# Patient Record
Sex: MALE | Race: WHITE | NOT HISPANIC OR LATINO | Employment: FULL TIME | URBAN - METROPOLITAN AREA
[De-identification: names, ages, dates, MRNs, and addresses within clinical notes are randomized per-mention and may not be internally consistent; named-entity substitution may affect disease eponyms.]

---

## 2019-07-12 ENCOUNTER — HOSPITAL ENCOUNTER (EMERGENCY)
Facility: HOSPITAL | Age: 24
Discharge: HOME | End: 2019-07-12
Attending: EMERGENCY MEDICINE | Admitting: EMERGENCY MEDICINE

## 2019-07-12 VITALS
DIASTOLIC BLOOD PRESSURE: 108 MMHG | BODY MASS INDEX: 41.75 KG/M2 | HEIGHT: 73 IN | HEART RATE: 92 BPM | WEIGHT: 315 LBS | TEMPERATURE: 98.4 F | SYSTOLIC BLOOD PRESSURE: 177 MMHG | OXYGEN SATURATION: 99 % | RESPIRATION RATE: 18 BRPM

## 2019-07-12 DIAGNOSIS — T16.1XXA FOREIGN BODY OF RIGHT EAR, INITIAL ENCOUNTER: Primary | ICD-10-CM

## 2019-07-12 DIAGNOSIS — I10 UNCONTROLLED HYPERTENSION: ICD-10-CM

## 2019-07-12 PROCEDURE — 99282 EMERGENCY DEPT VISIT SF MDM: CPT

## 2019-07-12 PROCEDURE — 99282 EMERGENCY DEPT VISIT SF MDM: CPT | Performed by: EMERGENCY MEDICINE

## 2019-07-12 PROCEDURE — 99283 EMERGENCY DEPT VISIT LOW MDM: CPT

## 2019-07-12 RX ORDER — NEOMYCIN SULFATE, POLYMYXIN B SULFATE AND HYDROCORTISONE 10; 3.5; 1 MG/ML; MG/ML; [USP'U]/ML
SUSPENSION/ DROPS AURICULAR (OTIC)
Qty: 10 ML | Refills: 0 | Status: SHIPPED | OUTPATIENT
Start: 2019-07-12 | End: 2020-08-11

## 2019-07-12 RX ORDER — HYDROCHLOROTHIAZIDE 25 MG/1
TABLET ORAL
Qty: 30 TABLET | Refills: 0 | Status: SHIPPED | OUTPATIENT
Start: 2019-07-12 | End: 2020-08-11

## 2019-07-12 RX ORDER — LIDOCAINE HYDROCHLORIDE 10 MG/ML
5 INJECTION, SOLUTION EPIDURAL; INFILTRATION; INTRACAUDAL; PERINEURAL ONCE
Status: COMPLETED | OUTPATIENT
Start: 2019-07-12 | End: 2019-07-12

## 2019-07-12 RX ORDER — NEOMYCIN SULFATE, POLYMYXIN B SULFATE AND HYDROCORTISONE 10; 3.5; 1 MG/ML; MG/ML; [USP'U]/ML
4 SUSPENSION/ DROPS AURICULAR (OTIC) ONCE
Status: COMPLETED | OUTPATIENT
Start: 2019-07-12 | End: 2019-07-12

## 2019-07-12 RX ADMIN — LIDOCAINE HYDROCHLORIDE 5 ML: 10 INJECTION, SOLUTION EPIDURAL; INFILTRATION; INTRACAUDAL; PERINEURAL at 02:55

## 2019-07-12 RX ADMIN — NEOMYCIN SULFATE, POLYMYXIN B SULFATE AND HYDROCORTISONE 4 DROP: 3.5; 10000; 1 SUSPENSION AURICULAR (OTIC) at 03:27

## 2019-07-12 NOTE — ED NOTES
Right ear irrigated with warm water.  Bug finally washed out.     Lamar Wright RN  07/12/19 4821

## 2019-07-12 NOTE — ED PROVIDER NOTES
" EMERGENCY DEPARTMENT ENCOUNTER      Room Number: 2/02      HPI:    Chief complaint: Bug in ear    Location: right    Quality/Severity:  moderate    Timing/Duration: just PTA    Modifying Factors: not improved c sticking Q-tip into ear after bug    Associated Symptoms: diminished hearing \"clogged\" sounding right ear. No bleeding or discharge    Narrative: Pt is a 23 y.o. male who presents complaining of bug flew into right ear just PTA. He tried irrigation and sticking a Q-tip in his ear PTA without relief. Diminished hearing in right ear now, \"like it is clogged.\"  Pt admits to hx of hypertension previously treated c medication which he stopped at 19 yo as he has not seen a PCP since.      PMD: Provider, No Known    REVIEW OF SYSTEMS  Review of Systems  Diminished hearing R ear. No bleeding.    PAST MEDICAL HISTORY  Active Ambulatory Problems     Diagnosis Date Noted   • No Active Ambulatory Problems     Resolved Ambulatory Problems     Diagnosis Date Noted   • No Resolved Ambulatory Problems     Past Medical History:   Diagnosis Date   • Hypertension        PAST SURGICAL HISTORY  Past Surgical History:   Procedure Laterality Date   • WRIST SURGERY         FAMILY HISTORY  History reviewed. No pertinent family history.    SOCIAL HISTORY  Social History     Socioeconomic History   • Marital status: Single     Spouse name: Not on file   • Number of children: Not on file   • Years of education: Not on file   • Highest education level: Not on file   Tobacco Use   • Smoking status: Current Every Day Smoker     Packs/day: 1.00   Substance and Sexual Activity   • Alcohol use: No     Frequency: Never   • Drug use: No       ALLERGIES  Penicillins    PHYSICAL EXAM  ED Triage Vitals [07/12/19 0253]   Temp Heart Rate Resp BP SpO2   98.4 °F (36.9 °C) 92 18 (!) 177/108 99 %      Temp src Heart Rate Source Patient Position BP Location FiO2 (%)   Oral -- -- -- --       Physical Exam   Constitutional: He is oriented to person, " place, and time and well-developed, well-nourished, and in no distress. No distress.   HENT:   MMM, poor dentition.  L TM nml  R ext auditory canal is excoritated without bleeding. FB c/w bug is deep. TM completely obscured. Diminished hearing confirmed.   Eyes: Conjunctivae are normal.   Neck:   Painless ROM noted   Cardiovascular: Normal rate and regular rhythm.   Pulmonary/Chest: Effort normal. No respiratory distress.   Neurological: He is alert and oriented to person, place, and time.   Skin: Skin is warm and dry.   Psychiatric: Mood and affect normal.       LAB RESULTS  No results found for this or any previous visit.      I ordered the above labs and reviewed the results    RADIOLOGY  No results found.    I ordered the above radiologic testing and reviewed the results    PROCEDURES  Procedures      PROGRESS AND CONSULTS  ED Course as of Jul 12 0326 Fri Jul 12, 2019   0324 Nursing completed successful irrigation of the right external auditory canal with complete removal of the foreign body.  Excoriation of the canal remains though the TM appears intact.  Hearing improved.  Plan Cortisporin and outpatient ENT follow-up with any concerns or continued complaints early next week.  Also discussed hypertension and need for outpatient follow-up.  Patient agreeable with plan to start HCTZ immediately.  [RS]      ED Course User Index  [RS] Flynn Walker MD           MEDICAL DECISION MAKING  Results were reviewed/discussed with the patient and they were also made aware of online access. Pt also made aware that some labs, such as cultures, will not be resulted during ER visit and follow up with PMD is necessary.     MDM       DIAGNOSIS  Final diagnoses:   Foreign body of right ear, bug-successfully removed   Uncontrolled hypertension       Latest Documented Vital Signs:  As of 3:26 AM  BP- (!) 177/108 HR- 92 Temp- 98.4 °F (36.9 °C) (Oral) O2 sat- 99%    DISPOSITION  Discharge-stable       Medication List      New  Prescriptions    hydrochlorothiazide 25 MG tablet  Commonly known as:  HYDRODIURIL  Take 1 tablet by mouth daily     neomycin-polymyxin-hydrocortisone 3.5-99249-9 otic suspension  Commonly known as:  CORTISPORIN  3-4 drops to affected ear 4 times daily for 1 week          Follow-up Information     [facility]. Call today.    Why:  Arrange outpatient follow-up for regular health maintenance  Contact information:  [address] [place] [address] [phone]           Izzy Acuna MD. Schedule an appointment as soon as possible for a visit today.    Specialty:  Otolaryngology  Why:  For wound re-check  Contact information:  [address] [facility] [address]  [place] [address]  [phone]                        Flynn Walker MD  07/12/19 0329

## 2020-06-22 ENCOUNTER — APPOINTMENT (OUTPATIENT)
Dept: GENERAL RADIOLOGY | Facility: HOSPITAL | Age: 25
End: 2020-06-22

## 2020-06-22 ENCOUNTER — HOSPITAL ENCOUNTER (EMERGENCY)
Facility: HOSPITAL | Age: 25
Discharge: HOME | End: 2020-06-22
Admitting: EMERGENCY MEDICINE

## 2020-06-22 VITALS
RESPIRATION RATE: 18 BRPM | SYSTOLIC BLOOD PRESSURE: 181 MMHG | HEART RATE: 78 BPM | DIASTOLIC BLOOD PRESSURE: 90 MMHG | TEMPERATURE: 98.9 F | OXYGEN SATURATION: 98 % | BODY MASS INDEX: 40.43 KG/M2 | HEIGHT: 74 IN | WEIGHT: 315 LBS

## 2020-06-22 DIAGNOSIS — I10 HYPERTENSION NOT AT GOAL: ICD-10-CM

## 2020-06-22 DIAGNOSIS — R07.89 ACUTE CHEST WALL PAIN: Primary | ICD-10-CM

## 2020-06-22 PROCEDURE — 99284 EMERGENCY DEPT VISIT MOD MDM: CPT | Performed by: EMERGENCY MEDICINE

## 2020-06-22 PROCEDURE — 99282 EMERGENCY DEPT VISIT SF MDM: CPT

## 2020-06-22 PROCEDURE — 71046 X-RAY EXAM CHEST 2 VIEWS: CPT

## 2020-06-22 PROCEDURE — 93010 ELECTROCARDIOGRAM REPORT: CPT | Performed by: INTERNAL MEDICINE

## 2020-06-22 PROCEDURE — 93005 ELECTROCARDIOGRAM TRACING: CPT

## 2020-06-22 NOTE — ED PROVIDER NOTES
EMERGENCY DEPARTMENT ENCOUNTER    Room Number:  Room/bed info not found  Date seen:  6/22/2020  Time seen: 1:56 PM  PCP: Provider, No Known  Historian: Patient      HPI:  Chief Complaint: Chest pain  A complete HPI/ROS/PMH/PSH/SH/FH are unobtainable due to: Nothing  Context: Jimi Contreras is a 24 y.o. male who presents to the ED c/o chest pain for 1 week.  He reports he has been constant.  He had a cough that has improved.  He denies fever, chills, shortness of air.  He also reports some pain in his back.  The pain is exacerbated by moving.  He reports a history of high blood pressure diagnosed 4 years ago and does not currently take blood pressure medication.  He stopped smoking 1 month ago.  He denies history of DVT or PE.            PAST MEDICAL HISTORY  Active Ambulatory Problems     Diagnosis Date Noted   • No Active Ambulatory Problems     Resolved Ambulatory Problems     Diagnosis Date Noted   • No Resolved Ambulatory Problems     Past Medical History:   Diagnosis Date   • Hypertension          PAST SURGICAL HISTORY  Past Surgical History:   Procedure Laterality Date   • WRIST SURGERY           FAMILY HISTORY  History reviewed. No pertinent family history.      SOCIAL HISTORY  Social History     Socioeconomic History   • Marital status: Single     Spouse name: Not on file   • Number of children: Not on file   • Years of education: Not on file   • Highest education level: Not on file   Tobacco Use   • Smoking status: Current Every Day Smoker     Packs/day: 1.00   Substance and Sexual Activity   • Alcohol use: No     Frequency: Never   • Drug use: No         ALLERGIES  Penicillins        REVIEW OF SYSTEMS  Review of Systems   Constitutional: Negative for diaphoresis and fever.   HENT: Negative for congestion.    Eyes: Negative for visual disturbance.   Respiratory: Negative for shortness of breath.    Cardiovascular: Positive for chest pain. Negative for palpitations.   Gastrointestinal: Negative for blood  in stool and vomiting.   Endocrine: Negative for polyuria.   Genitourinary: Negative for flank pain.   Musculoskeletal: Negative for joint swelling.   Skin: Negative for wound.   Neurological: Negative for seizures.   Hematological: Negative for adenopathy.   Psychiatric/Behavioral: Negative for sleep disturbance.            PHYSICAL EXAM  ED Triage Vitals   Temp Heart Rate Resp BP SpO2   06/22/20 1237 06/22/20 1237 06/22/20 1237 06/22/20 1240 06/22/20 1237   98.9 °F (37.2 °C) 78 18 (!) 181/90 98 %      Temp src Heart Rate Source Patient Position BP Location FiO2 (%)   06/22/20 1237 06/22/20 1237 -- -- --   Oral Monitor            GENERAL: Morbidly obese, no acute distress  HENT: nares patent  EYES: no scleral icterus  CV: regular rhythm, normal rate, no murmur  RESPIRATORY: normal effort, lungs clear to auscultation bilaterally.  Left anterior chest wall tenderness.  ABDOMEN: soft, nontender throughout  MUSCULOSKELETAL: no deformity.  Left anterior chest wall is mildly tender.  He also has some mild tenderness of the left upper back just medial to the scapula.  NEURO: alert, moves all extremities, follows commands  SKIN: warm, dry    Vital signs and nursing notes reviewed.            RADIOLOGY  Xr Chest 2 View    Result Date: 6/22/2020  CHEST X-RAY, 06/22/2020     HISTORY: 24-year-old male in the ED complaining of upper midline and left side chest pain.  TECHNIQUE: PA and lateral upright chest series.  FINDINGS: Heart size and pulmonary vascularity are normal. The lungs are expanded and clear. No visible pulmonary infiltrate or pleural effusion.      Negative chest.  This report was finalized on 6/22/2020 1:36 PM by Dr. Bakari Randhawa MD.        Ordered the above noted radiological studies. Reviewed by me in PACS.            PROCEDURES  Procedures      EKG interpreted by me:  Time: 1236  Rate and rhythm: Sinus rhythm, 74  IA: Normal  QRS: Normal axis  ST and T waves: No acute ischemic changes        MEDICATIONS  GIVEN IN ER  Medications - No data to display                MEDICAL DECISION MAKING, PROGRESS, and CONSULTS         MDM       HEART score 0 (caveat: no troponin ordered due to reproducible tenderness). EKG normal.  CXR without pneumothorax or mediastinal widening to suggest dissection.  Low risk for PE by Well's score, clinical gestault, PERC.  Reproducible chest wall tenderness suggest musculoskeletal pathology.  This is likely costochondritis related to his recent coughing.  He has no symptoms of COVID at this time.  His cough has since resolved.  Appropriate for discharge home with Tylenol PRN.  Follow-up with PCP for further blood pressure management.      DIAGNOSIS  Final diagnoses:   Acute chest wall pain   Hypertension not at goal         DISPOSITION  DISCHARGE    Patient discharged in stable condition.    Reviewed implications of results, diagnosis, meds, responsibility to follow up, warning signs and symptoms of possible worsening, potential complications and reasons to return to ER.    Patient/Family voiced understanding of above instructions.    Discussed plan for discharge, as there is no emergent indication for admission. Patient referred to primary care provider for BP management due to today's BP. Pt/family is agreeable and understands need for follow up and repeat testing.  Pt is aware that discharge does not mean that nothing is wrong but it indicates no emergency is present that requires admission and they must continue care with follow-up as given below or physician of their choice.     FOLLOW-UP  Tariq King Jr., DO  [address] [facility] [address] [phone]    Schedule an appointment as soon as possible for a visit            Medication List      No changes were made to your prescriptions during this visit.                   Latest Documented Vital Signs:  As of 13:56  BP- (!) 181/90 HR- 78 Temp- 98.9 °F (37.2 °C) (Oral) O2 sat- 98%        --    Please note that portions of  this were completed with a voice recognition program.          Chris Miles MD  06/22/20 0207

## 2020-06-22 NOTE — DISCHARGE INSTRUCTIONS
Take Tylenol as needed for pain.  Your blood pressure is elevated today.  Please establish care with a primary care physician for management of your high blood pressure.  Return to the ER for worsening symptoms.

## 2020-11-22 ENCOUNTER — APPOINTMENT (OUTPATIENT)
Dept: GENERAL RADIOLOGY | Facility: HOSPITAL | Age: 25
End: 2020-11-22

## 2020-11-22 ENCOUNTER — HOSPITAL ENCOUNTER (EMERGENCY)
Facility: HOSPITAL | Age: 25
Discharge: HOME | End: 2020-11-22
Attending: EMERGENCY MEDICINE | Admitting: EMERGENCY MEDICINE

## 2020-11-22 VITALS
TEMPERATURE: 98.3 F | RESPIRATION RATE: 14 BRPM | WEIGHT: 315 LBS | SYSTOLIC BLOOD PRESSURE: 148 MMHG | HEIGHT: 74 IN | DIASTOLIC BLOOD PRESSURE: 87 MMHG | HEART RATE: 70 BPM | BODY MASS INDEX: 40.43 KG/M2 | OXYGEN SATURATION: 100 %

## 2020-11-22 DIAGNOSIS — R00.2 PALPITATIONS: Primary | ICD-10-CM

## 2020-11-22 DIAGNOSIS — I10 HYPERTENSION, UNSPECIFIED TYPE: ICD-10-CM

## 2020-11-22 LAB
ALBUMIN SERPL-MCNC: 4.3 G/DL (ref 3.5–5.2)
ALBUMIN/GLOB SERPL: 1.4 G/DL
ALP SERPL-CCNC: 90 U/L (ref 39–117)
ALT SERPL W P-5'-P-CCNC: 21 U/L (ref 1–41)
ANION GAP SERPL CALCULATED.3IONS-SCNC: 8.4 MMOL/L (ref 5–15)
AST SERPL-CCNC: 15 U/L (ref 1–40)
BASOPHILS # BLD AUTO: 0.06 10*3/MM3 (ref 0–0.2)
BASOPHILS NFR BLD AUTO: 0.6 % (ref 0–1.5)
BILIRUB SERPL-MCNC: 0.4 MG/DL (ref 0–1.2)
BUN SERPL-MCNC: 8 MG/DL (ref 6–20)
BUN/CREAT SERPL: 8.2 (ref 7–25)
CALCIUM SPEC-SCNC: 9.9 MG/DL (ref 8.6–10.5)
CHLORIDE SERPL-SCNC: 102 MMOL/L (ref 98–107)
CO2 SERPL-SCNC: 26.6 MMOL/L (ref 22–29)
CREAT SERPL-MCNC: 0.98 MG/DL (ref 0.76–1.27)
DEPRECATED RDW RBC AUTO: 41.6 FL (ref 37–54)
EOSINOPHIL # BLD AUTO: 0.28 10*3/MM3 (ref 0–0.4)
EOSINOPHIL NFR BLD AUTO: 2.8 % (ref 0.3–6.2)
ERYTHROCYTE [DISTWIDTH] IN BLOOD BY AUTOMATED COUNT: 12.5 % (ref 12.3–15.4)
GFR SERPL CREATININE-BSD FRML MDRD: 96 ML/MIN/1.73
GLOBULIN UR ELPH-MCNC: 3 GM/DL
GLUCOSE SERPL-MCNC: 97 MG/DL (ref 65–99)
HCT VFR BLD AUTO: 50 % (ref 37.5–51)
HGB BLD-MCNC: 16.2 G/DL (ref 13–17.7)
IMM GRANULOCYTES # BLD AUTO: 0.03 10*3/MM3 (ref 0–0.05)
IMM GRANULOCYTES NFR BLD AUTO: 0.3 % (ref 0–0.5)
LYMPHOCYTES # BLD AUTO: 1.85 10*3/MM3 (ref 0.7–3.1)
LYMPHOCYTES NFR BLD AUTO: 18.3 % (ref 19.6–45.3)
MCH RBC QN AUTO: 29.3 PG (ref 26.6–33)
MCHC RBC AUTO-ENTMCNC: 32.4 G/DL (ref 31.5–35.7)
MCV RBC AUTO: 90.4 FL (ref 79–97)
MONOCYTES # BLD AUTO: 0.65 10*3/MM3 (ref 0.1–0.9)
MONOCYTES NFR BLD AUTO: 6.4 % (ref 5–12)
NEUTROPHILS NFR BLD AUTO: 7.26 10*3/MM3 (ref 1.7–7)
NEUTROPHILS NFR BLD AUTO: 71.6 % (ref 42.7–76)
NRBC BLD AUTO-RTO: 0 /100 WBC (ref 0–0.2)
PLATELET # BLD AUTO: 242 10*3/MM3 (ref 140–450)
PMV BLD AUTO: 11.2 FL (ref 6–12)
POTASSIUM SERPL-SCNC: 4.2 MMOL/L (ref 3.5–5.2)
PROT SERPL-MCNC: 7.3 G/DL (ref 6–8.5)
RBC # BLD AUTO: 5.53 10*6/MM3 (ref 4.14–5.8)
SODIUM SERPL-SCNC: 137 MMOL/L (ref 136–145)
WBC # BLD AUTO: 10.13 10*3/MM3 (ref 3.4–10.8)

## 2020-11-22 PROCEDURE — 71046 X-RAY EXAM CHEST 2 VIEWS: CPT

## 2020-11-22 PROCEDURE — 99284 EMERGENCY DEPT VISIT MOD MDM: CPT

## 2020-11-22 PROCEDURE — 85025 COMPLETE CBC W/AUTO DIFF WBC: CPT | Performed by: EMERGENCY MEDICINE

## 2020-11-22 PROCEDURE — 80053 COMPREHEN METABOLIC PANEL: CPT | Performed by: EMERGENCY MEDICINE

## 2020-11-22 PROCEDURE — 93010 ELECTROCARDIOGRAM REPORT: CPT | Performed by: INTERNAL MEDICINE

## 2020-11-22 PROCEDURE — 93005 ELECTROCARDIOGRAM TRACING: CPT | Performed by: EMERGENCY MEDICINE

## 2020-11-22 PROCEDURE — 99283 EMERGENCY DEPT VISIT LOW MDM: CPT | Performed by: EMERGENCY MEDICINE

## 2020-11-22 RX ORDER — SODIUM CHLORIDE 0.9 % (FLUSH) 0.9 %
10 SYRINGE (ML) INJECTION AS NEEDED
Status: DISCONTINUED | OUTPATIENT
Start: 2020-11-22 | End: 2020-11-22 | Stop reason: HOSPADM

## 2020-11-22 RX ADMIN — SODIUM CHLORIDE 1000 ML: 9 INJECTION, SOLUTION INTRAVENOUS at 19:37

## 2020-11-23 LAB — QT INTERVAL: 355 MS

## 2020-11-23 NOTE — ED PROVIDER NOTES
EMERGENCY DEPARTMENT ENCOUNTER      Room Number: TRAU/TR      HPI:      Narrative: Pt is a 24 y.o. male who presents complaining of what he describes as rapid heart beating and a tight sensation.  Initially described to triage his pain he tells me is not really pain it was more like a strange sensation as he felt like his heart rate was beating very quickly and firmly.  There was no associated shortness of breath no associated nausea or vomiting no associated diaphoresis and no dizziness.  He says just prior to this he was working [employer] and his boss said he looked flushed and should sit down so he sat down for a minute and developed a rapid and strong heartbeat which she felt was a little bit tight as well.  He said he felt anxious during this and reports having history of anxiety that he does not take anything for..  He says that his symptoms resolved relatively quickly and he is asymptomatic currently.  Other than this he has been feeling very well recently.      PMD: Provider, No Known    REVIEW OF SYSTEMS  Review of Systems   Constitutional: Negative for activity change, chills and fever.   HENT: Negative for facial swelling and trouble swallowing.    Eyes: Negative for photophobia and visual disturbance.   Respiratory: Negative for chest tightness and shortness of breath.    Cardiovascular: Positive for palpitations. Negative for chest pain and leg swelling.   Gastrointestinal: Negative for abdominal distention, abdominal pain, nausea and vomiting.   Genitourinary: Negative for difficulty urinating and dysuria.   Neurological: Negative for dizziness and weakness.         PAST MEDICAL HISTORY  Active Ambulatory Problems     Diagnosis Date Noted   • No Active Ambulatory Problems     Resolved Ambulatory Problems     Diagnosis Date Noted   • No Resolved Ambulatory Problems     Past Medical History:   Diagnosis Date   • Asthma    • Hypertension        PAST SURGICAL HISTORY  Past Surgical History:   Procedure  Laterality Date   • DENTAL PROCEDURE     • WRIST FRACTURE SURGERY      RT wrist   • WRIST SURGERY         FAMILY HISTORY  History reviewed. No pertinent family history.    SOCIAL HISTORY  Social History     Socioeconomic History   • Marital status: Single     Spouse name: Not on file   • Number of children: Not on file   • Years of education: Not on file   • Highest education level: Not on file   Tobacco Use   • Smoking status: Current Every Day Smoker     Packs/day: 1.00   • Smokeless tobacco: Never Used   • Tobacco comment: Quit smoking 5/2020   Substance and Sexual Activity   • Alcohol use: No     Frequency: Never   • Drug use: No   • Sexual activity: Defer       ALLERGIES  Penicillins      Current Facility-Administered Medications:   •  [COMPLETED] Insert peripheral IV, , , Once **AND** sodium chloride 0.9 % flush 10 mL, 10 mL, Intravenous, PRN, Jose Tellez MD  No current outpatient medications on file.    PHYSICAL EXAM  ED Triage Vitals   Temp Heart Rate Resp BP SpO2   11/22/20 1915 11/22/20 1915 11/22/20 1915 11/22/20 1917 11/22/20 1917   97.5 °F (36.4 °C) 80 16 (!) 161/117 99 %      Temp src Heart Rate Source Patient Position BP Location FiO2 (%)   11/22/20 1915 -- -- -- --   Skin           Physical Exam  INITIAL VITAL SIGNS: Reviewed by me.  Pulse ox normal  GENERAL: Alert and interactive. No acute distress.  HEAD: Head is normocephalic.  EYES: EOMI. PERRL. No scleral icterus. No conjunctival injection.  ENT: Moist mucous membranes.   NECK: Supple. Full range of motion.  RESPIRATORY: No tachypnea. Clear breath sounds bilaterally. No wheezing. No rales. No rhonchi.  CV: Regular rate and rhythm. No murmurs. No rubs or gallops.  ABDOMEN: Soft, non-distended, non-tender. No guarding. No rebound. No masses.   BACK:  No obvious deformity.  EXTREMITIES: No deformity. No clubbing or cyanosis. No edema.   SKIN: Warm and dry. No diaphoresis. No obvious rashes.   NEUROLOGIC: Alert and oriented. Face is symmetric.  Speech is normal.       LAB RESULTS  Results for orders placed or performed during the hospital encounter of 11/22/20   Comprehensive Metabolic Panel    Specimen: Blood   Result Value Ref Range    Glucose 97 65 - 99 mg/dL    BUN 8 6 - 20 mg/dL    Creatinine 0.98 0.76 - 1.27 mg/dL    Sodium 137 136 - 145 mmol/L    Potassium 4.2 3.5 - 5.2 mmol/L    Chloride 102 98 - 107 mmol/L    CO2 26.6 22.0 - 29.0 mmol/L    Calcium 9.9 8.6 - 10.5 mg/dL    Total Protein 7.3 6.0 - 8.5 g/dL    Albumin 4.30 3.50 - 5.20 g/dL    ALT (SGPT) 21 1 - 41 U/L    AST (SGOT) 15 1 - 40 U/L    Alkaline Phosphatase 90 39 - 117 U/L    Total Bilirubin 0.4 0.0 - 1.2 mg/dL    eGFR Non African Amer 96 >60 mL/min/1.73    Globulin 3.0 gm/dL    A/G Ratio 1.4 g/dL    BUN/Creatinine Ratio 8.2 7.0 - 25.0    Anion Gap 8.4 5.0 - 15.0 mmol/L   CBC Auto Differential    Specimen: Blood   Result Value Ref Range    WBC 10.13 3.40 - 10.80 10*3/mm3    RBC 5.53 4.14 - 5.80 10*6/mm3    Hemoglobin 16.2 13.0 - 17.7 g/dL    Hematocrit 50.0 37.5 - 51.0 %    MCV 90.4 79.0 - 97.0 fL    MCH 29.3 26.6 - 33.0 pg    MCHC 32.4 31.5 - 35.7 g/dL    RDW 12.5 12.3 - 15.4 %    RDW-SD 41.6 37.0 - 54.0 fl    MPV 11.2 6.0 - 12.0 fL    Platelets 242 140 - 450 10*3/mm3    Neutrophil % 71.6 42.7 - 76.0 %    Lymphocyte % 18.3 (L) 19.6 - 45.3 %    Monocyte % 6.4 5.0 - 12.0 %    Eosinophil % 2.8 0.3 - 6.2 %    Basophil % 0.6 0.0 - 1.5 %    Immature Grans % 0.3 0.0 - 0.5 %    Neutrophils, Absolute 7.26 (H) 1.70 - 7.00 10*3/mm3    Lymphocytes, Absolute 1.85 0.70 - 3.10 10*3/mm3    Monocytes, Absolute 0.65 0.10 - 0.90 10*3/mm3    Eosinophils, Absolute 0.28 0.00 - 0.40 10*3/mm3    Basophils, Absolute 0.06 0.00 - 0.20 10*3/mm3    Immature Grans, Absolute 0.03 0.00 - 0.05 10*3/mm3    nRBC 0.0 0.0 - 0.2 /100 WBC   ECG 12 Lead   Result Value Ref Range    QT Interval 355 ms         I ordered the above labs and reviewed the results    RADIOLOGY  Xr Chest 2 View    Result Date: 11/22/2020  CR Chest 2 Vws  INDICATION:  Chest pain on the left radiating to the left arm beginning 30 minutes prior to arrival COMPARISON:  6/22/2020 FINDINGS: PA and lateral views of the chest.  Heart and mediastinal contours are normal. The lungs are clear. No pneumothorax or pleural effusion.      No acute cardiopulmonary findings. Signer Name: Jose Schaeffer MD  Signed: 11/22/2020 8:00 PM  Workstation Name: [email or web address]  [facility]      I ordered the above radiologic testing and reviewed the results    PROCEDURES  Procedures  EKG           EKG time/Interp time: 1913/1920  Rhythm/Rate: Sinus rhythm rate of 78  P waves and VT: Normal P waves with normal VT interval  QRS, axis: Normal QRS duration and normal axis  ST and T waves: No ST elevations or depressions    Independently interpreted by me contemporaneously with treatment      PROGRESS AND CONSULTS           MEDICAL DECISION MAKING      MDM   Well-appearing 24-year-old male presents to the ER complaining of chest pain at triage but when I questioned him to describe it he says it is not pain but rather palpitations that lasted for a few minutes while at work.  He is asymptomatic right now and appears very well and has normal vital signs.  His ECG is normal I will check basic labs and get a chest x-ray.  This does not sound suspicious for ACS and he meets the PE rule out criteria.  Just make sure that his electrolytes are normal give him some fluids and make sure there is no evidence of pneumothorax or pneumonia.    Electrolyte panel is normal, no elevation in white blood cell count, his chest x-ray is clear his ECG is without dysrhythmia or acute ischemia.  I feel patient can be safely discharged home.    DIAGNOSIS  Final diagnoses:   Palpitations   Hypertension, unspecified type       Latest Documented Vital Signs:  As of 20:20 EST  BP- (!) 161/117 HR- 80 Temp- 97.5 °F (36.4 °C) (Skin) O2 sat- 99%    DISPOSITION  Home      Discussed pertinent labs and imaging  findings with the patient/family.  Patient/Family voiced understanding of need to follow-up for recheck, further testing as needed.  Return to the emergency Department warnings were given.         Medication List      No changes were made to your prescriptions during this visit.             Follow-up Information     Call  Provider, No Known.    Why: To set up primary care and a follow-up appointment.  Contact information:  [facility] [id number] [phone]                     Dictated utilizing Dragon dictation     Jose Tellez MD  11/22/20 2021

## 2020-11-23 NOTE — DISCHARGE INSTRUCTIONS
Please call the number listed to establish primary care and set up a follow-up appointment.  Return to the emergency room if you develop difficulty breathing, chest pain,

## 2020-11-23 NOTE — ED TRIAGE NOTES
Explained to pt that he is hurting himself by not taking BP meds as prescribed and that we may cannot undo the damage he is doing to himself.

## 2021-06-03 ENCOUNTER — HOSPITAL ENCOUNTER (EMERGENCY)
Facility: HOSPITAL | Age: 26
Discharge: HOME | End: 2021-06-03
Attending: EMERGENCY MEDICINE | Admitting: EMERGENCY MEDICINE

## 2021-06-03 VITALS
HEIGHT: 73 IN | HEART RATE: 111 BPM | BODY MASS INDEX: 41.75 KG/M2 | TEMPERATURE: 99.2 F | RESPIRATION RATE: 18 BRPM | DIASTOLIC BLOOD PRESSURE: 96 MMHG | WEIGHT: 315 LBS | OXYGEN SATURATION: 97 % | SYSTOLIC BLOOD PRESSURE: 141 MMHG

## 2021-06-03 DIAGNOSIS — K04.7 DENTAL ABSCESS: Primary | ICD-10-CM

## 2021-06-03 PROCEDURE — 99282 EMERGENCY DEPT VISIT SF MDM: CPT | Performed by: EMERGENCY MEDICINE

## 2021-06-03 PROCEDURE — 99283 EMERGENCY DEPT VISIT LOW MDM: CPT

## 2021-06-03 RX ORDER — CLINDAMYCIN HYDROCHLORIDE 300 MG/1
300 CAPSULE ORAL 3 TIMES DAILY
Qty: 21 CAPSULE | Refills: 0 | Status: SHIPPED | OUTPATIENT
Start: 2021-06-03 | End: 2021-06-10

## 2021-06-03 RX ORDER — CLINDAMYCIN HYDROCHLORIDE 150 MG/1
300 CAPSULE ORAL ONCE
Status: COMPLETED | OUTPATIENT
Start: 2021-06-03 | End: 2021-06-03

## 2021-06-03 RX ADMIN — CLINDAMYCIN HYDROCHLORIDE 300 MG: 150 CAPSULE ORAL at 09:02

## 2021-06-03 NOTE — DISCHARGE INSTRUCTIONS
Take antibiotics as directed.  Please return to the emergency room for any worsening pain, swelling, fevers, redness or any other concerns.

## 2021-06-03 NOTE — ED PROVIDER NOTES
Subjective   History of Present Illness  History of Present Illness    Chief complaint: Dental pain    Location: Upper incisors    Quality/Severity: Moderate pain and swelling    Timing/Duration: Present for the past 5 days    Modifying Factors: None    Narrative: This patient presents for evaluation of dental pain.  He tells me that he has had worsening pain in his upper teeth for the past 5 days and he is concerned about an abscess formation.  He does not have any dental providers.  He denies any recent trauma to the teeth.  He denies any methamphetamine or substance abuse.  He tells me he had an abnormal pattern of growth with unusual eruption of his upper incisors at the age of 18.  He tells me that he is supposed to get several teeth pulled but has not been able to do so.  He denies any fevers.    Associated Symptoms: As above    Review of Systems   Constitutional: Negative for activity change and fever.   HENT: Positive for dental problem and mouth sores. Negative for drooling, trouble swallowing and voice change.    Respiratory: Negative for cough and shortness of breath.    Cardiovascular: Negative for chest pain.   Gastrointestinal: Negative for abdominal pain.   Skin: Negative for color change and rash.   Neurological: Negative for syncope.   All other systems reviewed and are negative.      Past Medical History:   Diagnosis Date   • Asthma    • H/O dental abscess    • Hypertension        Allergies   Allergen Reactions   • Penicillins Swelling       Past Surgical History:   Procedure Laterality Date   • DENTAL PROCEDURE     • WRIST FRACTURE SURGERY      RT wrist   • WRIST SURGERY         History reviewed. No pertinent family history.    Social History     Socioeconomic History   • Marital status: Single     Spouse name: Not on file   • Number of children: Not on file   • Years of education: Not on file   • Highest education level: Not on file   Tobacco Use   • Smoking status: Current Every Day Smoker      Packs/day: 1.00   • Smokeless tobacco: Never Used   • Tobacco comment: Quit smoking 5/2020   Substance and Sexual Activity   • Alcohol use: No   • Drug use: No   • Sexual activity: Defer       ED Triage Vitals [06/03/21 0852]   Temp Heart Rate Resp BP SpO2   99.2 °F (37.3 °C) 111 18 141/96 97 %      Temp src Heart Rate Source Patient Position BP Location FiO2 (%)   Oral Monitor Sitting Right arm --         Objective   Physical Exam  Vitals and nursing note reviewed.   Constitutional:       Appearance: He is well-developed. He is not toxic-appearing.   HENT:      Head: Normocephalic and atraumatic.      Comments: There is no significant facial soft tissue swelling or erythema evident.     Nose: Nose normal.      Mouth/Throat:      Mouth: Mucous membranes are moist.      Comments: Both of the upper medial incisor show extensive decay.  The right medial upper incisor seems to be the tooth of greatest concern to the patient.  There is significant decay in this tooth with surrounding gingival tenderness and some ulcer formation in the mucosal aspect of the upper lip abutting this tooth.  Eyes:      General:         Right eye: No discharge.         Left eye: No discharge.      Pupils: Pupils are equal, round, and reactive to light.   Cardiovascular:      Rate and Rhythm: Normal rate and regular rhythm.   Pulmonary:      Effort: Pulmonary effort is normal. No respiratory distress.   Musculoskeletal:         General: No deformity. Normal range of motion.      Cervical back: Normal range of motion and neck supple.   Skin:     General: Skin is warm and dry.      Findings: No erythema or rash.   Neurological:      General: No focal deficit present.      Mental Status: He is alert and oriented to person, place, and time.   Psychiatric:         Behavior: Behavior normal.         Thought Content: Thought content normal.         Judgment: Judgment normal.         Procedures           ED Course  ED Course as of Jun 03 0904   u  Jun 03, 2021 0901 Patient presented for evaluation of a dental abscess problem.  I started him on clindamycin because he has a reported history of penicillin allergy.  I urged him to follow-up with a local dental clinic you can care for his dental concerns appropriately.  Gave him resource information to do so.    [CHRISTIAN]      ED Course User Index  [CHRISTIAN] Terrance Luo MD                                           [facility]    Final diagnoses:   Dental abscess       ED Disposition  ED Disposition     ED Disposition Condition Comment    Discharge Stable           [facility] [place] [address] [facility] [address]  [phone]  Call today  Call one of the local dental clinics to schedule an urgent follow-up appointment as we discussed.         Medication List      New Prescriptions    clindamycin 300 MG capsule  Commonly known as: CLEOCIN  Take 1 capsule by mouth 3 (Three) Times a Day for 7 days.           Where to Get Your Medications      These medications were sent to [facility] - [place] - [address] - [phone]  - [phone]   [address] [place] [address]    Phone: [phone]   · clindamycin 300 MG capsule          Terrance Luo MD  06/03/21 0904